# Patient Record
Sex: MALE | Race: WHITE
[De-identification: names, ages, dates, MRNs, and addresses within clinical notes are randomized per-mention and may not be internally consistent; named-entity substitution may affect disease eponyms.]

---

## 2020-09-29 ENCOUNTER — HOSPITAL ENCOUNTER (EMERGENCY)
Dept: HOSPITAL 61 - ER | Age: 50
Discharge: HOME | End: 2020-09-29
Payer: SELF-PAY

## 2020-09-29 VITALS — WEIGHT: 230.38 LBS | BODY MASS INDEX: 31.2 KG/M2 | HEIGHT: 72 IN

## 2020-09-29 VITALS — SYSTOLIC BLOOD PRESSURE: 164 MMHG | DIASTOLIC BLOOD PRESSURE: 73 MMHG

## 2020-09-29 DIAGNOSIS — S05.01XA: Primary | ICD-10-CM
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
## 2020-09-29 PROCEDURE — 99283 EMERGENCY DEPT VISIT LOW MDM: CPT

## 2020-09-29 NOTE — PHYS DOC
General Adult


EDM:


Chief Complaint:  FOREIGN BODY/EYES





HPI:


HPI:





Patient is a 50  year old male who presents with states 2 days ago he was at 

work and he was cleaning out a plastic injecting machine when a piece of the 

plastic flake went up and got in his eye.  He states he was wearing his 

protective PPE but somehow got in his right eye.  He states that he rubbed it 

and now the outer conjunctivae is hemorrhaged and he states he is having some 

blurred vision.  He states he is having pain.  He is up-to-date on his tetanus 

shot.  Patient rates his pain a 10 out of 10.  States it is sharp and throbbing.

 It is light sensitive.  There is no swelling on the outside of the eye.





Review of Systems:


Review of Systems:


Constitutional:   Denies fever or chills. []


Eyes:   Right eye blurry change in visual acuity, Conjunctiva redness, pain. []


HENT:   Denies nasal congestion or sore throat. [] 


Respiratory:   Denies cough or shortness of breath. [] 


Cardiovascular:   Denies chest pain or edema. [] 


GI:   Denies abdominal pain, nausea, vomiting, bloody stools or diarrhea. [] 


:  Denies dysuria. [] 


Musculoskeletal:   Denies back pain or joint pain. [] 


Integument:   Denies rash. [] 


Neurologic:   Denies headache, focal weakness or sensory changes. [] 


Endocrine:   Denies polyuria or polydipsia. [] 


Lymphatic:  Denies swollen glands. [] 


Psychiatric:  Denies depression or anxiety. []





Heart Score:


Risk Factors:


Risk Factors:  DM, Current or recent (<one month) smoker, HTN, HLP, family 

history of CAD, obesity.


Risk Scores:


Score 0 - 3:  2.5% MACE over next 6 weeks - Discharge Home


Score 4 - 6:  20.3% MACE over next 6 weeks - Admit for Clinical Observation


Score 7 - 10:  72.7% MACE over next 6 weeks - Early Invasive Strategies





Current Medications:





Current Medications








 Medications


  (Trade)  Dose


 Ordered  Sig/Curtis  Start Time


 Stop Time Status Last Admin


Dose Admin


 


 Fluorescein Sodium


  (Ful-[place])  1 strip  1X  ONCE  9/29/20 15:00


 9/29/20 15:01   





 


 Tetracaine HCl


  (Tetracaine)  1 drop  1X  ONCE  9/29/20 15:00


 9/29/20 15:01   














Allergies:


Allergies:





Allergies








Coded Allergies Type Severity Reaction Last Updated Verified


 


  No Known Drug Allergies    9/29/20 No











Physical Exam:


PE:





Constitutional: Well developed, well nourished, no acute distress, non-toxic 

appearance. []


HENT: Normocephalic, atraumatic, bilateral external ears normal, oropharynx 

moist, no oral exudates, nose normal. []


Eyes: PERRLA, EOMI, conjunctiva hemorrhage, no discharge. [] 


Neck: Normal range of motion, no tenderness, supple, no stridor. [] 


Cardiovascular:Heart rate regular rhythm, no murmur []


Lungs & Thorax:  Bilateral breath sounds clear to auscultation []


Abdomen: Bowel sounds normal, soft, no tenderness, no masses, no pulsatile 

masses. [] 


Skin: Warm, dry, no erythema, no rash. [] 


Back: No tenderness, no CVA tenderness. [] 


Extremities: No tenderness, no cyanosis, no clubbing, ROM intact, no edema. [] 


Neurologic: Alert and oriented X 3, normal motor function, normal sensory 

function, no focal deficits noted. []


Psychologic: Affect normal, judgement normal, mood normal. []





EKG:


EKG:


[]





Radiology/Procedures:


Radiology/Procedures:


[]





Course & Med Decision Making:


Course & Med Decision Making


Pertinent Labs and Imaging studies reviewed. (See chart for details)





See HPI.  Alert and oriented x4.  Ambulatory with a steady gait.  PERRLA.  

Speaks in full clear sentences.





Eye Exam





Visual accuity:





Eye exam: PERRL,  Extraocular muscles intact. No signs of ruptured globe. Sclera

 outer right eye hemmorhage. Red reflex present. 





Foreign body: No foreign bodies seen with examination or with lid flip exam. 





[place]-pen: N/A





Fluorescein test: Corneal abrasion present to out and inner conjunctiva.  





Anesthetic: Tetracaine





[]





Dragon Disclaimer:


Dragon Disclaimer:


This electronic medical record was generated, in whole or in part, using a voice

 recognition dictation system.





Departure


Departure


Impression:  


   Primary Impression:  


   Corneal abrasion, right


   Qualified Codes:  S05.01XA - Injury of conjunctiva and corneal abrasion 

   without foreign body, right eye, initial encounter


Disposition:  01 HOME, SELF-CARE


Condition:  STABLE


Referrals:  


NO PCP (PCP)








WILLIAM LUZ MD


Patient Instructions:  Eye - Corneal Abrasion





Additional Instructions:  


Follow-up with an eye doctor soon as possible.  I have referred you to the eye 

doctor here at [facility].  Use antibiotic ointment as prescribed.


Scripts


Hydrocodone/Apap 5-325 (NORCO 5-325 TABLET) 1 Each Tablet


1 TAB PO PRN Q6HRS PRN for PAIN, #10 TAB 0 Refills


   Prov: DANILO العلي APRN         9/29/20 


Erythromycin Base (Erythromycin) 1 Gm Oint...g.


1 GM OP QID for 10 Days, #1 MISC


   Prov: DANILO العلي         9/29/20





Justicifation of Admission Dx:


Justifications for Admission:


Justification of Admission Dx:  N/A











DANILO العلي APRLUYC            Sep 29, 2020 14:46